# Patient Record
Sex: MALE | Race: WHITE | Employment: FULL TIME | URBAN - METROPOLITAN AREA
[De-identification: names, ages, dates, MRNs, and addresses within clinical notes are randomized per-mention and may not be internally consistent; named-entity substitution may affect disease eponyms.]

---

## 2017-01-23 ENCOUNTER — HOSPITAL ENCOUNTER (OUTPATIENT)
Age: 32
Discharge: HOME | End: 2017-01-23
Attending: FAMILY MEDICINE
Payer: MEDICAID

## 2017-01-23 VITALS
RESPIRATION RATE: 20 BRPM | HEIGHT: 73 IN | SYSTOLIC BLOOD PRESSURE: 134 MMHG | BODY MASS INDEX: 30.48 KG/M2 | OXYGEN SATURATION: 99 % | WEIGHT: 230 LBS | HEART RATE: 58 BPM | TEMPERATURE: 97 F | DIASTOLIC BLOOD PRESSURE: 76 MMHG

## 2017-01-23 DIAGNOSIS — H10.9 CONJUNCTIVITIS OF LEFT EYE, UNSPECIFIED CONJUNCTIVITIS TYPE: Primary | ICD-10-CM

## 2017-01-23 PROCEDURE — 99214 OFFICE O/P EST MOD 30 MIN: CPT

## 2017-01-23 PROCEDURE — 99213 OFFICE O/P EST LOW 20 MIN: CPT

## 2017-01-23 RX ORDER — CIPROFLOXACIN HYDROCHLORIDE 3.5 MG/ML
2 SOLUTION/ DROPS TOPICAL EVERY 8 HOURS
Qty: 1 BOTTLE | Refills: 0 | Status: SHIPPED | OUTPATIENT
Start: 2017-01-23 | End: 2017-01-30

## 2017-01-23 NOTE — ED INITIAL ASSESSMENT (HPI)
Patient states his left eye was swollen yesterday. Patient states this morning it was swollen shut and was crusty.

## 2017-01-24 NOTE — ED PROVIDER NOTES
Patient Seen in: [facility]    History   Patient presents with:   Eye Visual Problem (opthalmic)    Stated Complaint: LEFT EYE SWELLING  / POSSIBLE PINK EYE    HPI  26 yo M here with L eye swelling and irritation  No fb , no inju Review of Systems    Positive for stated complaint: LEFT EYE SWELLING  / POSSIBLE PINK EYE  Other systems are as noted in HPI. Constitutional and vital signs reviewed. All other systems reviewed and negative except as noted above.     PSF compresses twice daily. Frequent hand washing. No contacts or eye make up for next 5 days. You're not contagious after 24 hours of medication.         Disposition and Plan     Clinical Impression:  Conjunctivitis of left eye, unspecified conjunctivitis t

## 2017-01-31 ENCOUNTER — HOSPITAL ENCOUNTER (OUTPATIENT)
Age: 32
Discharge: HOME | End: 2017-01-31
Attending: FAMILY MEDICINE
Payer: MEDICAID

## 2017-01-31 VITALS
OXYGEN SATURATION: 98 % | DIASTOLIC BLOOD PRESSURE: 75 MMHG | SYSTOLIC BLOOD PRESSURE: 102 MMHG | HEART RATE: 58 BPM | TEMPERATURE: 98 F | RESPIRATION RATE: 16 BRPM

## 2017-01-31 DIAGNOSIS — L03.115 CELLULITIS OF RIGHT FOOT: Primary | ICD-10-CM

## 2017-01-31 DIAGNOSIS — J45.30 MILD PERSISTENT ASTHMA WITHOUT COMPLICATION: ICD-10-CM

## 2017-01-31 DIAGNOSIS — Z76.0 ENCOUNTER FOR MEDICATION REFILL: ICD-10-CM

## 2017-01-31 PROCEDURE — 99214 OFFICE O/P EST MOD 30 MIN: CPT

## 2017-01-31 PROCEDURE — 96372 THER/PROPH/DIAG INJ SC/IM: CPT

## 2017-01-31 RX ORDER — ALBUTEROL SULFATE 90 UG/1
2 AEROSOL, METERED RESPIRATORY (INHALATION) EVERY 4 HOURS PRN
Qty: 1 INHALER | Refills: 0 | Status: SHIPPED | OUTPATIENT
Start: 2017-01-31

## 2017-01-31 RX ORDER — IBUPROFEN 600 MG/1
600 TABLET ORAL ONCE
Status: COMPLETED | OUTPATIENT
Start: 2017-01-31 | End: 2017-01-31

## 2017-01-31 RX ORDER — AMOXICILLIN AND CLAVULANATE POTASSIUM 875; 125 MG/1; MG/1
875 TABLET, FILM COATED ORAL 2 TIMES DAILY
Qty: 20 TABLET | Refills: 0 | Status: SHIPPED | OUTPATIENT
Start: 2017-01-31 | End: 2017-02-17 | Stop reason: ALTCHOICE

## 2017-01-31 NOTE — ED PROVIDER NOTES
Patient Seen in: THE [facility] In [facility] [address]    History   Patient presents with:  Cellulitis (integumentary, infectious)    Stated Complaint: poss toe infection    HPI    This 26-year-old male presents the office with complaint of worsening pain Wheezing. Fluticasone Propionate HFA (FLOVENT HFA) 110 MCG/ACT Inhalation Aerosol,  Inhale 1 puff into the lungs 2 (two) times daily.    Albuterol Sulfate HFA (VENTOLIN HFA) 108 (90 BASE) MCG/ACT Inhalation Aero Soln,  Inhale 2 puffs into the lungs every midline  NECK:  No cervical lymphadenopathy. No thyromegaly,  HEART: Regular rate and rhythm, no S3, S4 or murmur noted. LUNGS: Clear to ausculation. No retractions or tachypnea noted. EXTREMITIES: The right leg is examined.   He has normal range of motio soon as possible for a visit in 2 days  recheck cellulitis      Medications Prescribed:  Current Discharge Medication List    START taking these medications    Amoxicillin-Pot Clavulanate (AUGMENTIN) 875-125 MG Oral Tab  Take 1 tablet by mouth 2 (two) time

## 2017-01-31 NOTE — ED INITIAL ASSESSMENT (HPI)
Patient presents to [facility]. Care with cc of painful,red and swollen 5th toe on the right x 2 days. No injury noted No drainage or fever noted. +CMS. +pedal pulse

## 2017-02-17 ENCOUNTER — HOSPITAL ENCOUNTER (OUTPATIENT)
Age: 32
Discharge: HOME | End: 2017-02-17
Attending: FAMILY MEDICINE
Payer: MEDICAID

## 2017-02-17 ENCOUNTER — APPOINTMENT (OUTPATIENT)
Dept: GENERAL RADIOLOGY | Age: 32
End: 2017-02-17
Attending: FAMILY MEDICINE
Payer: MEDICAID

## 2017-02-17 VITALS
TEMPERATURE: 98 F | OXYGEN SATURATION: 98 % | DIASTOLIC BLOOD PRESSURE: 76 MMHG | HEART RATE: 70 BPM | RESPIRATION RATE: 16 BRPM | SYSTOLIC BLOOD PRESSURE: 117 MMHG

## 2017-02-17 DIAGNOSIS — J11.1 INFLUENZA: Primary | ICD-10-CM

## 2017-02-17 DIAGNOSIS — J45.901 ASTHMA EXACERBATION: ICD-10-CM

## 2017-02-17 DIAGNOSIS — Z76.0 ENCOUNTER FOR MEDICATION REFILL: ICD-10-CM

## 2017-02-17 LAB
#LYMPHOCYTE IC: 2.3 X10ˆ3/UL (ref 0.9–3.2)
#MXD IC: 0.8 X10ˆ3/UL (ref 0.1–1)
#NEUTROPHIL IC: 9.2 X10ˆ3/UL (ref 1.3–6.7)
CREAT SERPL-MCNC: 0.9 MG/DL (ref 0.7–1.2)
GLUCOSE BLD-MCNC: 100 MG/DL (ref 65–99)
HCT IC: 46.3 % (ref 37–54)
HGB IC: 15.4 G/DL (ref 13–17)
ISTAT BLOOD GAS TCO2: 29 MMOL/L (ref 22–32)
ISTAT BUN: 14 MG/DL (ref 8–20)
ISTAT CHLORIDE: 104 MMOL/L (ref 101–111)
ISTAT HEMATOCRIT: 47 % (ref 37–54)
ISTAT IONIZED CALCIUM: 1.17 MMOL/L (ref 1.12–1.32)
ISTAT POTASSIUM: 4.4 MMOL/L (ref 3.6–5.1)
ISTAT SODIUM: 143 MMOL/L (ref 136–144)
MCH IC: 28.8 PG (ref 27–33.2)
MCHC IC: 33.3 G/DL (ref 31–37)
MCV IC: 86.5 FL (ref 80–99)
PLT IC: 250 X10ˆ3/UL (ref 150–450)
POCT BILIRUBIN URINE: NEGATIVE
POCT GLUCOSE URINE: NEGATIVE MG/DL
POCT KETONE URINE: NEGATIVE MG/DL
POCT LEUKOCYTE ESTERASE URINE: NEGATIVE
POCT NITRITE URINE: NEGATIVE
POCT PH URINE: 7 (ref 5–8)
POCT PROTEIN URINE: NEGATIVE MG/DL
POCT SPECIFIC GRAVITY URINE: 1.02
POCT URINE CLARITY: CLEAR
POCT UROBILINOGEN URINE: 0.2 MG/DL
RBC IC: 5.35 X10ˆ6/UL (ref 4.3–5.7)
WBC IC: 12.3 X10ˆ3/UL (ref 4–13)

## 2017-02-17 PROCEDURE — 99214 OFFICE O/P EST MOD 30 MIN: CPT

## 2017-02-17 PROCEDURE — 85025 COMPLETE CBC W/AUTO DIFF WBC: CPT | Performed by: FAMILY MEDICINE

## 2017-02-17 PROCEDURE — 94640 AIRWAY INHALATION TREATMENT: CPT

## 2017-02-17 PROCEDURE — 80047 BASIC METABLC PNL IONIZED CA: CPT

## 2017-02-17 PROCEDURE — 81002 URINALYSIS NONAUTO W/O SCOPE: CPT | Performed by: FAMILY MEDICINE

## 2017-02-17 PROCEDURE — 36415 COLL VENOUS BLD VENIPUNCTURE: CPT

## 2017-02-17 PROCEDURE — 71020 XR CHEST PA + LAT CHEST (CPT=71020): CPT

## 2017-02-17 RX ORDER — IPRATROPIUM BROMIDE AND ALBUTEROL SULFATE 2.5; .5 MG/3ML; MG/3ML
3 SOLUTION RESPIRATORY (INHALATION) ONCE
Status: COMPLETED | OUTPATIENT
Start: 2017-02-17 | End: 2017-02-17

## 2017-02-17 RX ORDER — PREDNISONE 20 MG/1
TABLET ORAL
Qty: 10 TABLET | Refills: 0 | Status: SHIPPED | OUTPATIENT
Start: 2017-02-17

## 2017-02-17 RX ORDER — ALBUTEROL SULFATE 2.5 MG/3ML
2.5 SOLUTION RESPIRATORY (INHALATION) EVERY 4 HOURS PRN
Qty: 1 BOX | Refills: 0 | Status: SHIPPED | OUTPATIENT
Start: 2017-02-17

## 2017-02-17 RX ORDER — CODEINE PHOSPHATE AND GUAIFENESIN 10; 100 MG/5ML; MG/5ML
SOLUTION ORAL NIGHTLY PRN
Qty: 120 ML | Refills: 0 | Status: SHIPPED | OUTPATIENT
Start: 2017-02-17

## 2017-02-17 RX ORDER — AZITHROMYCIN 250 MG/1
TABLET, FILM COATED ORAL
Qty: 1 PACKAGE | Refills: 0 | Status: SHIPPED | OUTPATIENT
Start: 2017-02-17

## 2017-02-17 NOTE — ED PROVIDER NOTES
Patient Seen in: [facility] END    History   Patient presents with:  Cough/URI  Abdominal Pain    Stated Complaint: body aches,chills,chest congestion,cough, 3 days    HPI    This 78-year-old male with known history for asthma presgeoff date: 02/12/2011    Smokeless Status: Never Used                        Alcohol Use: Yes               Review of Systems    Positive for stated complaint: body aches,chills,chest congestion,cough, 3 days  Other systems are as noted in HPI.   Constitutional components within normal limits   POCT ISTAT CHEM8 CARTRIDGE - Abnormal; Notable for the following:     ISTAT Glucose 100 (*)     All other components within normal limits     Xr Chest Pa + Lat Chest (sko=36770)    2/17/2017  PROCEDURE:  XR CHEST PA + LAT Clinical Impression:  Influenza  (primary encounter diagnosis)  Asthma exacerbation  Encounter for medication refill    Disposition:  Discharge    Follow-up:  Pallavi Haskins MD   [facility] [address] [facility] [address] [address]     Schedule an a

## 2017-02-17 NOTE — ED INITIAL ASSESSMENT (HPI)
On Tuesday started with cough, chest congestion, fever-tmax 101  Wednesday night, with chills and body aches. Coughing-up dark yellow to brownish phlegm. Nasal discharges thick yellow-dark brown with blood streak.      Also with abdominal pain and diarrhea

## 2020-11-27 ENCOUNTER — APPOINTMENT (OUTPATIENT)
Dept: LAB | Facility: HOSPITAL | Age: 35
End: 2020-11-27
Attending: UROLOGY
Payer: OTHER GOVERNMENT

## 2021-01-05 ENCOUNTER — LAB ENCOUNTER (OUTPATIENT)
Dept: LAB | Facility: HOSPITAL | Age: 36
End: 2021-01-05
Attending: PATHOLOGY
Payer: OTHER GOVERNMENT

## 2022-09-03 ENCOUNTER — HOSPITAL ENCOUNTER (OUTPATIENT)
Age: 37
Discharge: HOME | End: 2022-09-03
Attending: EMERGENCY MEDICINE
Payer: OTHER GOVERNMENT

## 2022-09-03 VITALS
BODY MASS INDEX: 29.82 KG/M2 | TEMPERATURE: 97 F | OXYGEN SATURATION: 98 % | HEIGHT: 73 IN | DIASTOLIC BLOOD PRESSURE: 84 MMHG | SYSTOLIC BLOOD PRESSURE: 131 MMHG | RESPIRATION RATE: 20 BRPM | WEIGHT: 225 LBS | HEART RATE: 73 BPM

## 2022-09-03 DIAGNOSIS — U07.1 COVID-19: Primary | ICD-10-CM

## 2022-09-03 DIAGNOSIS — J02.0 STREP PHARYNGITIS: ICD-10-CM

## 2022-09-03 LAB
POCT INFLUENZA A: NEGATIVE
POCT INFLUENZA B: NEGATIVE
S PYO AG THROAT QL: POSITIVE
SARS-COV-2 RNA RESP QL NAA+PROBE: DETECTED

## 2022-09-03 PROCEDURE — 87502 INFLUENZA DNA AMP PROBE: CPT | Performed by: EMERGENCY MEDICINE

## 2022-09-03 PROCEDURE — 99204 OFFICE O/P NEW MOD 45 MIN: CPT

## 2022-09-03 PROCEDURE — 87880 STREP A ASSAY W/OPTIC: CPT

## 2022-09-03 PROCEDURE — 99203 OFFICE O/P NEW LOW 30 MIN: CPT

## 2022-09-03 RX ORDER — AMOXICILLIN 500 MG/1
500 TABLET, FILM COATED ORAL 2 TIMES DAILY
Qty: 20 TABLET | Refills: 0 | Status: SHIPPED | OUTPATIENT
Start: 2022-09-03 | End: 2022-09-13

## 2022-09-03 RX ORDER — HYDROCODONE BITARTRATE AND ACETAMINOPHEN 5; 325 MG/1; MG/1
1 TABLET ORAL EVERY 6 HOURS PRN
Qty: 10 TABLET | Refills: 0 | Status: SHIPPED | OUTPATIENT
Start: 2022-09-03 | End: 2022-09-08

## 2022-09-03 NOTE — ED INITIAL ASSESSMENT (HPI)
Body aches, chills, cough  X 1 week  With nasal congestion . Pt is vaccinated. Denies fever. Takes tylenol 2  Tabsand benadryl  Tabs.

## 2023-02-05 ENCOUNTER — HOSPITAL ENCOUNTER (OUTPATIENT)
Age: 38
Discharge: HOME | End: 2023-02-05
Payer: OTHER GOVERNMENT

## 2023-02-05 VITALS
SYSTOLIC BLOOD PRESSURE: 127 MMHG | WEIGHT: 220 LBS | HEIGHT: 73 IN | HEART RATE: 66 BPM | OXYGEN SATURATION: 100 % | TEMPERATURE: 98 F | BODY MASS INDEX: 29.16 KG/M2 | DIASTOLIC BLOOD PRESSURE: 63 MMHG | RESPIRATION RATE: 20 BRPM

## 2023-02-05 DIAGNOSIS — H66.002 NON-RECURRENT ACUTE SUPPURATIVE OTITIS MEDIA OF LEFT EAR WITHOUT SPONTANEOUS RUPTURE OF TYMPANIC MEMBRANE: Primary | ICD-10-CM

## 2023-02-05 PROCEDURE — 99213 OFFICE O/P EST LOW 20 MIN: CPT

## 2023-02-05 RX ORDER — AMOXICILLIN 875 MG/1
875 TABLET, COATED ORAL 2 TIMES DAILY
Qty: 20 TABLET | Refills: 0 | Status: SHIPPED | OUTPATIENT
Start: 2023-02-05 | End: 2023-02-15

## 2023-02-05 NOTE — DISCHARGE INSTRUCTIONS
Please return to the Emergency department/clinic if symptoms worsen. Follow up with your primary care physician in 1-2 days as needed. Take any medications prescribed to you as instructed and complete any antibiotic prescription you begin. While taking antibiotics it is recommended that you also take an over the counter probiotics. If you'd like, you can have 2 servings of yogurt/Kefir daily instead. Probiotics increase the good bacteria in your gut while the antibiotic fights the bad bacteria that is causing your infection. The good bacteria in your gut act as part of your immune system. Taking a probiotic while on antibiotics will decrease the chances of upset stomach and diarrhea, which are common side effects of antibiotics.

## 2023-08-14 ENCOUNTER — HOSPITAL ENCOUNTER (EMERGENCY)
Facility: HOSPITAL | Age: 38
Discharge: HOME | End: 2023-08-14
Attending: EMERGENCY MEDICINE
Payer: OTHER GOVERNMENT

## 2023-08-14 ENCOUNTER — APPOINTMENT (OUTPATIENT)
Dept: GENERAL RADIOLOGY | Facility: HOSPITAL | Age: 38
End: 2023-08-14
Payer: OTHER GOVERNMENT

## 2023-08-14 ENCOUNTER — APPOINTMENT (OUTPATIENT)
Dept: CT IMAGING | Facility: HOSPITAL | Age: 38
End: 2023-08-14
Attending: EMERGENCY MEDICINE
Payer: OTHER GOVERNMENT

## 2023-08-14 VITALS
WEIGHT: 234 LBS | SYSTOLIC BLOOD PRESSURE: 113 MMHG | TEMPERATURE: 97 F | HEIGHT: 73 IN | DIASTOLIC BLOOD PRESSURE: 79 MMHG | HEART RATE: 50 BPM | OXYGEN SATURATION: 97 % | BODY MASS INDEX: 31.01 KG/M2 | RESPIRATION RATE: 18 BRPM

## 2023-08-14 DIAGNOSIS — R07.89 CHEST PAIN, ATYPICAL: Primary | ICD-10-CM

## 2023-08-14 DIAGNOSIS — R55 SYNCOPE, UNSPECIFIED SYNCOPE TYPE: ICD-10-CM

## 2023-08-14 LAB
ALBUMIN SERPL-MCNC: 4 G/DL (ref 3.4–5)
ALBUMIN/GLOB SERPL: 1.1 {RATIO} (ref 1–2)
ALP LIVER SERPL-CCNC: 115 U/L
ALT SERPL-CCNC: 59 U/L
ANION GAP SERPL CALC-SCNC: 2 MMOL/L (ref 0–18)
APTT PPP: 29.4 SECONDS (ref 23.3–35.6)
AST SERPL-CCNC: 37 U/L (ref 15–37)
BASOPHILS # BLD AUTO: 0.11 X10(3) UL (ref 0–0.2)
BASOPHILS NFR BLD AUTO: 0.8 %
BILIRUB SERPL-MCNC: 0.4 MG/DL (ref 0.1–2)
BUN BLD-MCNC: 17 MG/DL (ref 7–18)
CALCIUM BLD-MCNC: 9 MG/DL (ref 8.5–10.1)
CHLORIDE SERPL-SCNC: 106 MMOL/L (ref 98–112)
CO2 SERPL-SCNC: 29 MMOL/L (ref 21–32)
CREAT BLD-MCNC: 0.9 MG/DL
D DIMER PPP FEU-MCNC: 0.46 UG/ML FEU (ref ?–0.5)
EGFRCR SERPLBLD CKD-EPI 2021: 112 ML/MIN/1.73M2 (ref 60–?)
EOSINOPHIL # BLD AUTO: 0.2 X10(3) UL (ref 0–0.7)
EOSINOPHIL NFR BLD AUTO: 1.4 %
ERYTHROCYTE [DISTWIDTH] IN BLOOD BY AUTOMATED COUNT: 12.8 %
GLOBULIN PLAS-MCNC: 3.8 G/DL (ref 2.8–4.4)
GLUCOSE BLD-MCNC: 84 MG/DL (ref 70–99)
GLUCOSE BLD-MCNC: 99 MG/DL (ref 70–99)
HCT VFR BLD AUTO: 46.1 %
HGB BLD-MCNC: 15.5 G/DL
IMM GRANULOCYTES # BLD AUTO: 0.04 X10(3) UL (ref 0–1)
IMM GRANULOCYTES NFR BLD: 0.3 %
INR BLD: 0.96 (ref 0.85–1.16)
LYMPHOCYTES # BLD AUTO: 3.41 X10(3) UL (ref 1–4)
LYMPHOCYTES NFR BLD AUTO: 23.4 %
MCH RBC QN AUTO: 29 PG (ref 26–34)
MCHC RBC AUTO-ENTMCNC: 33.6 G/DL (ref 31–37)
MCV RBC AUTO: 86.3 FL
MONOCYTES # BLD AUTO: 0.67 X10(3) UL (ref 0.1–1)
MONOCYTES NFR BLD AUTO: 4.6 %
NEUTROPHILS # BLD AUTO: 10.16 X10 (3) UL (ref 1.5–7.7)
NEUTROPHILS # BLD AUTO: 10.16 X10(3) UL (ref 1.5–7.7)
NEUTROPHILS NFR BLD AUTO: 69.5 %
NT-PROBNP SERPL-MCNC: 42 PG/ML (ref ?–125)
OSMOLALITY SERPL CALC.SUM OF ELEC: 286 MOSM/KG (ref 275–295)
PLATELET # BLD AUTO: 248 10(3)UL (ref 150–450)
POTASSIUM SERPL-SCNC: 4.2 MMOL/L (ref 3.5–5.1)
PROT SERPL-MCNC: 7.8 G/DL (ref 6.4–8.2)
PROTHROMBIN TIME: 12.8 SECONDS (ref 11.6–14.8)
RBC # BLD AUTO: 5.34 X10(6)UL
SARS-COV-2 RNA RESP QL NAA+PROBE: NOT DETECTED
SODIUM SERPL-SCNC: 137 MMOL/L (ref 136–145)
TROPONIN I HIGH SENSITIVITY: 5 NG/L
WBC # BLD AUTO: 14.6 X10(3) UL (ref 4–11)

## 2023-08-14 PROCEDURE — 99285 EMERGENCY DEPT VISIT HI MDM: CPT

## 2023-08-14 PROCEDURE — 84484 ASSAY OF TROPONIN QUANT: CPT | Performed by: EMERGENCY MEDICINE

## 2023-08-14 PROCEDURE — 71045 X-RAY EXAM CHEST 1 VIEW: CPT

## 2023-08-14 PROCEDURE — 85025 COMPLETE CBC W/AUTO DIFF WBC: CPT | Performed by: EMERGENCY MEDICINE

## 2023-08-14 PROCEDURE — 85379 FIBRIN DEGRADATION QUANT: CPT | Performed by: EMERGENCY MEDICINE

## 2023-08-14 PROCEDURE — 36415 COLL VENOUS BLD VENIPUNCTURE: CPT

## 2023-08-14 PROCEDURE — 80053 COMPREHEN METABOLIC PANEL: CPT | Performed by: EMERGENCY MEDICINE

## 2023-08-14 PROCEDURE — 93005 ELECTROCARDIOGRAM TRACING: CPT

## 2023-08-14 PROCEDURE — 70450 CT HEAD/BRAIN W/O DYE: CPT | Performed by: EMERGENCY MEDICINE

## 2023-08-14 PROCEDURE — 93010 ELECTROCARDIOGRAM REPORT: CPT

## 2023-08-14 PROCEDURE — 82962 GLUCOSE BLOOD TEST: CPT

## 2023-08-14 PROCEDURE — 85610 PROTHROMBIN TIME: CPT | Performed by: EMERGENCY MEDICINE

## 2023-08-14 PROCEDURE — 85730 THROMBOPLASTIN TIME PARTIAL: CPT | Performed by: EMERGENCY MEDICINE

## 2023-08-14 PROCEDURE — 83880 ASSAY OF NATRIURETIC PEPTIDE: CPT | Performed by: EMERGENCY MEDICINE

## 2023-08-14 NOTE — ED QUICK NOTES
Patient is stating he is having chest pain and that he passed out on Saturday while in bed with wife.

## 2023-08-14 NOTE — ED INITIAL ASSESSMENT (HPI)
Patient in with hx asthma. Worsening SOB since Saturday. Has been using rescue inhaler and tx since Saturday.  97% RA, clear sentences on arrival.

## 2023-08-15 ENCOUNTER — HOSPITAL ENCOUNTER (OUTPATIENT)
Dept: CV DIAGNOSTICS | Facility: HOSPITAL | Age: 38
Discharge: HOME | End: 2023-08-15
Attending: EMERGENCY MEDICINE
Payer: OTHER GOVERNMENT

## 2023-08-15 DIAGNOSIS — R07.89 CHEST PAIN, ATYPICAL: ICD-10-CM

## 2023-08-15 LAB
ATRIAL RATE: 56 BPM
P AXIS: 77 DEGREES
P-R INTERVAL: 142 MS
Q-T INTERVAL: 428 MS
QRS DURATION: 116 MS
QTC CALCULATION (BEZET): 413 MS
R AXIS: 58 DEGREES
T AXIS: 53 DEGREES
VENTRICULAR RATE: 56 BPM

## 2023-08-15 PROCEDURE — 93350 STRESS TTE ONLY: CPT | Performed by: EMERGENCY MEDICINE

## 2023-08-15 PROCEDURE — 93017 CV STRESS TEST TRACING ONLY: CPT | Performed by: EMERGENCY MEDICINE

## 2023-08-15 PROCEDURE — 93018 CV STRESS TEST I&R ONLY: CPT | Performed by: EMERGENCY MEDICINE

## 2023-08-15 NOTE — CM/SW NOTE
Received a call from Dr. Ken Celis requesting assistance in scheduling OP Stress Echo. Arranged scheduled OP test with patient. RN notified.

## 2025-08-11 ENCOUNTER — HOSPITAL ENCOUNTER (OUTPATIENT)
Age: 40
Discharge: HOME | End: 2025-08-11

## 2025-08-11 ENCOUNTER — APPOINTMENT (OUTPATIENT)
Dept: GENERAL RADIOLOGY | Age: 40
End: 2025-08-11
Attending: NURSE PRACTITIONER

## 2025-08-11 VITALS
RESPIRATION RATE: 18 BRPM | OXYGEN SATURATION: 96 % | HEART RATE: 75 BPM | WEIGHT: 235 LBS | TEMPERATURE: 99 F | DIASTOLIC BLOOD PRESSURE: 79 MMHG | HEIGHT: 73 IN | SYSTOLIC BLOOD PRESSURE: 124 MMHG | BODY MASS INDEX: 31.14 KG/M2

## 2025-08-11 DIAGNOSIS — S61.217A LACERATION OF LEFT LITTLE FINGER WITHOUT FOREIGN BODY WITHOUT DAMAGE TO NAIL, INITIAL ENCOUNTER: Primary | ICD-10-CM

## 2025-08-11 PROCEDURE — 73140 X-RAY EXAM OF FINGER(S): CPT | Performed by: NURSE PRACTITIONER

## 2025-08-11 PROCEDURE — 12001 RPR S/N/AX/GEN/TRNK 2.5CM/<: CPT

## 2025-08-11 PROCEDURE — 99214 OFFICE O/P EST MOD 30 MIN: CPT

## 2025-08-11 PROCEDURE — 99213 OFFICE O/P EST LOW 20 MIN: CPT

## 2025-08-11 RX ORDER — GABAPENTIN 100 MG/1
100 CAPSULE ORAL 3 TIMES DAILY
COMMUNITY

## 2025-08-11 RX ORDER — DEXTROAMPHETAMINE SACCHARATE, AMPHETAMINE ASPARTATE, DEXTROAMPHETAMINE SULFATE AND AMPHETAMINE SULFATE 5; 5; 5; 5 MG/1; MG/1; MG/1; MG/1
20 TABLET ORAL DAILY
COMMUNITY

## (undated) NOTE — LETTER
[facility] IN [place]  [address] [address]  Dept: [phone]  Dept Fax: [phone]         January 23, 2017    Patient: Laurel Mariano   YOB: 1985   Date of Visit: 1/23/2017       To Whom It May Abi

## (undated) NOTE — ED AVS SNAPSHOT
[facility] in [address]    [address] [place] [address]    Phone:  [phone]    Fax:  [address]   MRN: [id number]    Department:  [facility] in [address] [place] [address],[address]   Date of Visit:  1/31/2017 Inhale 2 puffs into the lungs every 4 (four) hours as needed for Wheezing.             Where to Get Your Medications      These medications were sent to [address], Jailyn Leach [address], [address] To Check ER Wait Times:  TEXT 'ERwait' to 267[address]      Click [email or web address]      Or call [phone]    If you have any problems with your follow-up, please call our  at [phone].     Si usted tiene algun problema con garcia sequimiento, por I have read and understand the instructions given to me by my caregivers. 24-Hour Pharmacies        Pharmacy Address Phone Number   [address] [facility] ([address]) [address] [place] [address].  [facility] & visit,  view other health information, and more. To sign up or find more information, go to [email or web address] and click on the Sign Up Now link in the [employer] box.      Enter your [employer] Activation Code exactly as it appears below along with yo

## (undated) NOTE — LETTER
Date & Time: 9/3/2022, 10:26 AM  Patient: Uzma Hopkins  Encounter Provider(s):    Lico Willingham MD       To Whom It May Concern:    Sherwin Arauz was seen and treated in our department on 9/3/2022. He should not return to work until 9/7.     If you have any questions or concerns, please do not hesitate to call.        _____________________________  Physician/APC Signature

## (undated) NOTE — ED AVS SNAPSHOT
[facility] in [facility] [address]    Phone:  [phone]    Fax:  [address]   MRN: [id number]    Department:  [facility] in [facility]   Date of Visit:  2/17/2017 albuterol sulfate (2.5 MG/3ML) 0.083% Nebu   Quantity:  1 Box   Commonly known as:  VENTOLIN   Take 3 mL (2.5 mg total) by nebulization every 4 (four) hours as needed for Wheezing or Shortness of Breath. What changed:   This medication is already on your in the stool. Go to the emergency room if you have increased difficulty breathing. Follow up with your primary doctor in 3-5 days if not improving.     Discharge References/Attachments     ASTHMA, ACUTE (ADULT) (ENGLISH)    INFLUENZA  (ENGLISH)    Remy Beatty care or specialist physician will see patients referred from the [facility]. Follow-up care is at the discretion of that Physician.     IF THERE IS ANY CHANGE OR WORSENING OF YOUR CONDITION, CALL YOUR PRIMARY CARE PHYSICIAN AT ONCE OR GO TO THE harming yourself, contact [facility] at [phone]. - If you don’t have insurance, Manju Muñoz has partnered with Patient [address] to help you get signed up for insurance coverage.   Patient [place] your Zip Code and Date of Birth to complete the sign-up process. If you do not sign up before the expiration date, you must request a new code.     Your unique [employer] Access Code: [email or web address]  Expires: 3/24/2017 11:45 AM    If you have questions, you can c

## (undated) NOTE — ED AVS SNAPSHOT
[facility] in [facility] [address]    [address]    Phone:  [phone]    Fax:  [address]   MRN: [id number]    Department:  THE [facility] in [address] [place] [address],[address]   Date of Visit:  1/23/2017 Insurance plans vary and the physician(s) referred by the Immediate Care may not be covered by your plan. Please contact your insurance company to determine coverage for follow-up care and referrals. [facility]  [address] If you have been prescribed any medication(s), please fill your prescription right away and begin taking the medication(s) as directed.     If the Immediate Care Provider has read X-rays, these will be re-interpreted by a radiologist.  If there is a signifi can help with your Affordable Care Act coverage, as well as all types of Medicaid plans. To get signed up and covered, please call [phone] and ask to get set up for an insurance coverage that is in-network with Manju Ennis

## (undated) NOTE — LETTER
[facility] IN [place]  [address] [address]  Dept: [phone]  Dept Fax: [phone]         January 31, 2017    Patient: Karina Morgan   YOB: 1985   Date of Visit: 1/31/2017       To Whom It May Abi